# Patient Record
Sex: FEMALE | Race: ASIAN | Employment: UNEMPLOYED | URBAN - METROPOLITAN AREA
[De-identification: names, ages, dates, MRNs, and addresses within clinical notes are randomized per-mention and may not be internally consistent; named-entity substitution may affect disease eponyms.]

---

## 2017-11-30 ENCOUNTER — HOSPITAL ENCOUNTER (OUTPATIENT)
Dept: FAMILY PLANNING/WOMEN'S HEALTH CLINIC | Age: 27
Discharge: HOME | End: 2017-11-30
Payer: COMMERCIAL

## 2017-11-30 PROCEDURE — 99202 OFFICE O/P NEW SF 15 MIN: CPT

## 2017-11-30 NOTE — DISCHARGE INSTRUCTIONS
Breastfeeding instructions    · Follow up: with Dr. Dodie Lopez for evaluation of tongue tie [facility] · Eat at least 3 well-balanced meals per day with snacks and drink to thirst. Start herbal supplements as directed. · Watch for babys natural feeding cues: e.g. sucking on fist, rooting. Offer breast frequently during the day ; every 2 hours. · Pumping Instructions: Pump both breasts for 15 -20 minutes after daytime feedings and anytime a bottle is given in place of a breast feeding. · Feed baby expressed breast milk (up to 1 ounce) after breastfeeding(s). ·  If baby doesn't breast feed give 3 oz at each feeding. · Keep written record of feeding times and babys urine and stool output. · Call pediatrician if baby:  1. Has less than 6 wet / 3-4 stools in 24 hours (newborns over 6 days)  2. Has concentrated, strong smelling urine  3. Is lethargic or not feeding  4. Has forceful spitting  5. Is extremely fussy or colicky and cannot be calmed  6. Is jaundiced / color is yellow  7. Is running a fever  8. Blood or mucous in the stool  9. Any concerns    INFORMATION ON TONGUE TIE:    Tongue-tie is an anatomical abnormality affecting the ability of the tongue to move in an appropriate way to facilitate proper suckling at the breast. This may affect either the ability to stick the tongue out or to lift the tongue upward or both. Tongue-tie can sometimes be seen when the baby raises the tongue and sometimes can on ly be felt by an experienced medical professional on exam.    Jeris Bent can contribute to nipple pain and damage, ineffective breast emptying, tiring easily at the breast, short sleep cycles, reflux and colic, lip blisters, and poor weight gain. In a majority of cases,  tongue tie is accompanied by lip tie as well which prevents the upper lip from flanging out properly. Almost all tongue-tie and lip-tie can be treated with a simple medical procedure to release the tie.  This procedure should be performed by an experienced medical professional. Mariza Roldan refers clients suspected of tongue or lip tie to Dr. Heather Tong at [address] located at [address] [place] [address], [address] [phone]    For additional information on tongue and lip tie and post procedure exercises go to [facility]. Foods / drinks to increase breast milk supply  (do not eat if allergic)  Carrots  Humus  Asparagus  [place]  Oatmeal  Cereal bars, protein bars, granola bars especially chocolate covered  Olive oil  Lemon juice  Nuts-macadamia, almonds, cashews  Papaya (especially green papaya and unripe)  Green super foods   Ginger - including candied ginger and ginger ale  Flax seed  Brewers yeast  Spinach  Beet leaves  Basil  Fennel seed and fennel  Garlic  Barley   Brown rice  Chic peas  Cumin   Black sesame seeds  Dried apricots  Sweet potatoes  Lactogenic cookies with chocolate chips  Sesame seeds (bagels or buns)  Mother's milk tea  Chocolate flavored ovaltine  Shaheed instant coffee  8 glasses of water (drink to thirst and try having some water while you breastfeed your baby)        Avoid the following, as they may dry up your breast milk supply - parsley,alis, peppermint including peppermint flavored  gum and altoids. INCREASING MILK SUPPLY    [address] follows the recommendations of the Academy of Breastfeeding Medicine and International Lactation [employer] Association for increasing milk supply. These recommendations are based upon current evidence and apply to women experiencing difficulties with a low rate of milk production. · Skin to skin contact between mother and baby encourages frequent feeding and stimulates the release of hormones that help the milk to let down. · Lactating women need to maintain adequate calorie and fluid intake and rest when baby is sleeping. Try to take in an additional 500 calories per day per baby.   Drink to thirst but it is not necessary to force fluids. · Self breast massage prior to feeding and breast compression while the baby is nursing may help to increase the intake of breast milk. Massage, light stroking toward the nipple and gentle shaking while leaning forward when baby is not latched on may help stimulate a second let down. · Relaxation techniques such as deep breathing, meditation, soft music and dim lighting may help to facilitate let down. · Increase stimulation and milk removal by feeding frequently and latching baby deeply to promote thorough drainage of the breasts. Pump if baby is not latching. (Minimum 8 feedings/pumpings in a 24 hour period and 12 times maximum)  · Pumping for 10-15 minutes after breastfeeding with an automatic cycling breast pump that is capable of draining both breasts, hospital grade if available, will increase stimulation of the breasts and increase milk production. It may take several (2-3) days before you notice an increase. The pump should be adjusted to the highest maximum comfortable vacuum to enhance milk flow and milk yield. Flange size is important so your nipple can move freely in the flange as not to cause the nipple to swell. Graduate vacuum suction to feel like babys suck. You can use the letdown button several times during your pumping session to promote more efficient drainage of the breast.  · Hand expression after pumping may increase milk yield and fat content of milk. You may find it helpful to view the video demonstrating several hand expression techniques found at: http://[email or web address] [email or web address]/Breastfeeding/MaxProduction. html  · Anemia, thyroid malfunction, polycystic ovary syndrome and hormonal birth control such as the mini pill or Depo-provera can impact production in some women and may need to be evaluated by your healthcare provider.   · If taking thyroid medicine, thyroid levels should be periodically checked and medicine adjusted as needed. · Contact your healthcare provider regarding pharmacological or herbal products to increase milk supply. · Avoid products that contain peppermint (Altoids, Starlight mints) and alis.

## 2017-11-30 NOTE — PROGRESS NOTES
[facility] [facility]  [facility]  [address] [place] [address] Drive, [address] [facility] [address]  Dept: [phone]    LACTATION REPORT TO HEALTHCARE PROVIDER    Date: 2017    Dear Kevin Tucker MD: Jasper Young: This is to inform you that I have seen    Baby name (First Name, Last Name): (P) Tiffany Tellez                                  Mother: Cristi Anglin    Reason for Visit: (P) insufficient weight gain    Baby date of birth: (P) 17        Birth Weight (Born Outside of [place]): (P) 3.725 kg (8 lb 3.4 oz)           Date                  Weight        Recorded Weight Date 1: 17  Recorded Weight 1: 4.542 kg (10 lb 0.2 oz)                Pre-feed Weight: 4.451 kg (9 lb 13 oz)  Post-feed Weight Left Breast: 4.505 kg (9 lb 14.9 oz)  Post-feed Weight Right Breast: 4.476 kg (9 lb 13.9 oz)     Total amount of milk transferred: Total Weight Gain: 1.6 oz    Total time of feedin minutes    Amount of milk pumped (PC): 30 ml    Amount of milk/formula supplemented: 0    Breast Assessment:  Left Breast: Medium  Left Nipple: Everted; Intact  Right Breast: Medium  Right Nipple: Everted; Intact    Oral assessement:  (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 Infant also keeps upper lip tucked in during feeding, not much flanging out. Mother denies sore nipples or ever having sore nipples. Infant is not fussy after feeding, but seems satisfied with her intake. Encouraged mother to schedule a consult with Dr. Dianelys Santoro for evaluation of lip and tongue tie. Also encouraged continued pumping after feedings during the day and feeding of any available ebm to baby after feedings. Baby feeds about 8 times a day and reviewed with mother that her intake should be a little over 3 ounces each feeding in order to get the required 25 ounces in in 24 hours.   Encouraged hospital grade pump rental if herbal supplements and frequent pumping are not enough to boost her supply. Recommended follow up appointment in 10 days at [facility] to evaluate infant's intake at breast again. Enclosed please find a copy of the instructions given to the patient. Please feel free to contact me at [facility] with any questions or concerns.     Thank you,    Celeste Orr RN IBCLC